# Patient Record
Sex: FEMALE | Race: WHITE | URBAN - METROPOLITAN AREA
[De-identification: names, ages, dates, MRNs, and addresses within clinical notes are randomized per-mention and may not be internally consistent; named-entity substitution may affect disease eponyms.]

---

## 2024-01-10 ENCOUNTER — OFFICE VISIT (OUTPATIENT)
Dept: ENDOCRINOLOGY | Age: 74
End: 2024-01-10
Payer: MEDICARE

## 2024-01-10 VITALS
WEIGHT: 145.2 LBS | DIASTOLIC BLOOD PRESSURE: 88 MMHG | SYSTOLIC BLOOD PRESSURE: 134 MMHG | OXYGEN SATURATION: 94 % | HEIGHT: 61 IN | BODY MASS INDEX: 27.41 KG/M2 | HEART RATE: 76 BPM

## 2024-01-10 DIAGNOSIS — E06.3 HASHIMOTO'S THYROIDITIS: ICD-10-CM

## 2024-01-10 DIAGNOSIS — E03.9 PRIMARY HYPOTHYROIDISM: Primary | ICD-10-CM

## 2024-01-10 DIAGNOSIS — I48.0 PAROXYSMAL ATRIAL FIBRILLATION (HCC): ICD-10-CM

## 2024-01-10 PROCEDURE — G8427 DOCREV CUR MEDS BY ELIG CLIN: HCPCS | Performed by: PHYSICIAN ASSISTANT

## 2024-01-10 PROCEDURE — 99204 OFFICE O/P NEW MOD 45 MIN: CPT | Performed by: PHYSICIAN ASSISTANT

## 2024-01-10 PROCEDURE — 1090F PRES/ABSN URINE INCON ASSESS: CPT | Performed by: PHYSICIAN ASSISTANT

## 2024-01-10 PROCEDURE — 1036F TOBACCO NON-USER: CPT | Performed by: PHYSICIAN ASSISTANT

## 2024-01-10 PROCEDURE — G8419 CALC BMI OUT NRM PARAM NOF/U: HCPCS | Performed by: PHYSICIAN ASSISTANT

## 2024-01-10 PROCEDURE — 1123F ACP DISCUSS/DSCN MKR DOCD: CPT | Performed by: PHYSICIAN ASSISTANT

## 2024-01-10 PROCEDURE — G8400 PT W/DXA NO RESULTS DOC: HCPCS | Performed by: PHYSICIAN ASSISTANT

## 2024-01-10 PROCEDURE — G8484 FLU IMMUNIZE NO ADMIN: HCPCS | Performed by: PHYSICIAN ASSISTANT

## 2024-01-10 PROCEDURE — 3017F COLORECTAL CA SCREEN DOC REV: CPT | Performed by: PHYSICIAN ASSISTANT

## 2024-01-10 RX ORDER — LEVOTHYROXINE SODIUM 0.07 MG/1
75 TABLET ORAL DAILY
COMMUNITY

## 2024-01-10 ASSESSMENT — ENCOUNTER SYMPTOMS
SHORTNESS OF BREATH: 0
DIARRHEA: 1
TROUBLE SWALLOWING: 1
CONSTIPATION: 1
VOICE CHANGE: 1

## 2024-01-10 NOTE — PROGRESS NOTES
[facility] ENDOCRINOLOGY   AND   THYROID NODULE CLINIC    Valorie Lane PA-C  [facility] Endocrinology and Thyroid Nodule Clinic  [address] [place] [address], [facility] [address], [place] [address]  Phone [phone]  Facsimile [phone]          Gris Garcia is a 73 y.o. female seen 1/10/2024 at the request of Dr. Restrepo for the evaluation of primary hypothyroidism        Assessment and Plan:      1. Primary hypothyroidism  Patient with highly variable thyroid labs.  Historically taking her medication inappropriately.  She is currently taking her medication first thing in the morning with only water however she is taking another pill within 10 to 15 minutes and having coffee    We reviewed appropriate dosing, take medication first thing in the morning, with only water, nothing else eat or drink, no other medication waiting 30 minutes to an hour before eating or drinking or taking medication.  Wait 4 hours before ingesting minerals or multivitamins.    Take as Rx'd and update labs in approx 6 weeks, again, at same lab, just prior to follow up    - TSH; Future  - T4, Free; Future  - TSH; Future  - T4, Free; Future    2. Hashimoto's thyroiditis  TPO antibodies positive indicating Hashimoto's thyroiditis. Advised that diagnosis of one autoimmune disease increases risk of developing other autoimmune disease. Importance of self care, stress management, self monitoring was emphasized.      3. Paroxysmal atrial fibrillation (HCC)  Adjust meds to normal TFTs, watch for overtreatment in context of HX of A-fib        Gris was seen today for new patient.    Diagnoses and all orders for this visit:    Primary hypothyroidism  -     TSH; Future  -     T4, Free; Future  -     TSH; Future  -     T4, Free; Future    Hashimoto's thyroiditis    Paroxysmal atrial fibrillation (HCC)            History of Present Illness:    1/10/2024         THYROID DYSFUNCTION  Gris Garcia is seen for new evaluation/management of primary

## 2024-02-28 ENCOUNTER — PATIENT MESSAGE (OUTPATIENT)
Dept: ENDOCRINOLOGY | Age: 74
End: 2024-02-28

## 2024-02-28 NOTE — TELEPHONE ENCOUNTER
Renae response:    I'm sorry you are having these symptoms  Thanks for letting me know you had the labs drawn    I see the 2/21/2024    TSH  2.987    Free T4  1.64    This is great news!     It also means that whatever is causing your symptoms is not your thyroid. Are you able to reach out to primary care and let them know what you are feeling so you can get checked for non-thyroid causes of these complaints?    Please recheck your labs at the same lab just before we meet in May    Lulu

## 2024-02-28 NOTE — TELEPHONE ENCOUNTER
From: Gris Garcia  To: Valroie Lane  Sent: 2/28/2024 10:46 AM EST  Subject: thyroid    Since my thyroid levels have finally leveled to a normal range, the last 2 weeks are bad. I can't sleep, always tired, hungry, mood swings, depressed, angry, hip pain, leg and feet pain at night. etc.

## 2024-05-14 ENCOUNTER — OFFICE VISIT (OUTPATIENT)
Dept: ENDOCRINOLOGY | Age: 74
End: 2024-05-14
Payer: MEDICARE

## 2024-05-14 VITALS
OXYGEN SATURATION: 98 % | HEIGHT: 60 IN | SYSTOLIC BLOOD PRESSURE: 126 MMHG | BODY MASS INDEX: 26.82 KG/M2 | HEART RATE: 84 BPM | DIASTOLIC BLOOD PRESSURE: 84 MMHG | WEIGHT: 136.6 LBS

## 2024-05-14 DIAGNOSIS — I48.0 PAROXYSMAL ATRIAL FIBRILLATION (HCC): ICD-10-CM

## 2024-05-14 DIAGNOSIS — E06.3 HASHIMOTO'S THYROIDITIS: ICD-10-CM

## 2024-05-14 DIAGNOSIS — E03.9 PRIMARY HYPOTHYROIDISM: Primary | ICD-10-CM

## 2024-05-14 PROCEDURE — G8427 DOCREV CUR MEDS BY ELIG CLIN: HCPCS | Performed by: PHYSICIAN ASSISTANT

## 2024-05-14 PROCEDURE — G8400 PT W/DXA NO RESULTS DOC: HCPCS | Performed by: PHYSICIAN ASSISTANT

## 2024-05-14 PROCEDURE — 1036F TOBACCO NON-USER: CPT | Performed by: PHYSICIAN ASSISTANT

## 2024-05-14 PROCEDURE — 99214 OFFICE O/P EST MOD 30 MIN: CPT | Performed by: PHYSICIAN ASSISTANT

## 2024-05-14 PROCEDURE — 1123F ACP DISCUSS/DSCN MKR DOCD: CPT | Performed by: PHYSICIAN ASSISTANT

## 2024-05-14 PROCEDURE — G8419 CALC BMI OUT NRM PARAM NOF/U: HCPCS | Performed by: PHYSICIAN ASSISTANT

## 2024-05-14 PROCEDURE — 1090F PRES/ABSN URINE INCON ASSESS: CPT | Performed by: PHYSICIAN ASSISTANT

## 2024-05-14 PROCEDURE — 3017F COLORECTAL CA SCREEN DOC REV: CPT | Performed by: PHYSICIAN ASSISTANT

## 2024-05-14 PROCEDURE — G2211 COMPLEX E/M VISIT ADD ON: HCPCS | Performed by: PHYSICIAN ASSISTANT

## 2024-05-14 RX ORDER — LEVOTHYROXINE SODIUM 0.05 MG/1
50 TABLET ORAL DAILY
Qty: 90 TABLET | Refills: 1 | Status: SHIPPED | OUTPATIENT
Start: 2024-05-14

## 2024-05-14 ASSESSMENT — ENCOUNTER SYMPTOMS
VOICE CHANGE: 1
DIARRHEA: 1
CONSTIPATION: 1
TROUBLE SWALLOWING: 1
SHORTNESS OF BREATH: 0

## 2024-05-14 NOTE — PROGRESS NOTES
[facility] ENDOCRINOLOGY   AND   THYROID NODULE CLINIC    Valorie Lane PA-C  [facility] Endocrinology and Thyroid Nodule Clinic  [address] [place] [address], [place] [address]  Phone [phone]  Facsimile [phone]          Gris Garcia is a 73 y.o. female seen 5/14/2024 at the request of Dr. Restrepo for the evaluation of primary hypothyroidism        Assessment and Plan:    1. Primary hypothyroidism  Now taking medication correctly and is overtreated with a-fib  Reduce dose from 75mcg to 50mcg  Continue to take correctly  Update labs in 6-8 weeks  Titrate to normal TFTs, watch for overtreatment in context of HX of A-fib    - levothyroxine (SYNTHROID) 50 MCG tablet; Take 1 tablet by mouth daily  Dispense: 90 tablet; Refill: 1  - TSH; Future  - T4, Free; Future  - TSH; Future  - T4, Free; Future    2. Hashimoto's thyroiditis  TPO antibodies positive indicating Hashimoto's thyroiditis. Previously, advised that diagnosis of one autoimmune disease increases risk of developing other autoimmune disease. Importance of self care, stress management, self monitoring was emphasized.      3. Paroxysmal atrial fibrillation (HCC)  Adjust meds to normal TFTs, watch for overtreatment in context of HX of A-fib        Gris was seen today for follow-up.    Diagnoses and all orders for this visit:    Primary hypothyroidism  -     levothyroxine (SYNTHROID) 50 MCG tablet; Take 1 tablet by mouth daily  -     TSH; Future  -     T4, Free; Future  -     TSH; Future  -     T4, Free; Future    Hashimoto's thyroiditis    Paroxysmal atrial fibrillation (HCC)              History of Present Illness:    5/14/2024         THYROID DYSFUNCTION  Gris Garcia is seen for follow up evaluation/management of primary hypothryoidism; this was diagnosed at age 48.      Review of Records:  pt with afib and hypothyroidism referred for uncontrolled labs     Current symptoms:    Age 48  After hysterectomy following years of hormone

## 2024-06-05 ENCOUNTER — PATIENT MESSAGE (OUTPATIENT)
Dept: ENDOCRINOLOGY | Age: 74
End: 2024-06-05

## 2024-06-05 DIAGNOSIS — E03.9 PRIMARY HYPOTHYROIDISM: Primary | ICD-10-CM

## 2024-06-05 RX ORDER — LEVOTHYROXINE SODIUM 0.07 MG/1
TABLET ORAL
Qty: 80 TABLET | Refills: 1 | Status: SHIPPED | OUTPATIENT
Start: 2024-06-05 | End: 2024-06-07

## 2024-06-05 NOTE — TELEPHONE ENCOUNTER
From: Gris Garcia  To: Valorie Lane  Sent: 6/5/2024 7:23 AM EDT  Subject: thyroid    with the change of levothyroxine 75 to 50 MCG, after a week I itch all over my body. and I just recently started to get rash.  Hungry all the time.    Gris Garcia 1950

## 2024-06-05 NOTE — TELEPHONE ENCOUNTER
Spoke to pt    Has pruritic rash of abdomen (bilateral) and neck  Feeling hungry    No SOB, oral swelling  Took benadryl for 3 days with no improvement     Willing to resume the 75mcg dose and take 5 day per week, TDD 54    If rash does not improve, seek evaluation from PCP. If worsens or involves airway, proceed to ER    If improves with med change we can consider challenging 50mcg tablet at a later date as it is least likely to cause allergic response

## 2024-06-07 ENCOUNTER — PATIENT MESSAGE (OUTPATIENT)
Dept: ENDOCRINOLOGY | Age: 74
End: 2024-06-07

## 2024-06-07 DIAGNOSIS — E03.9 PRIMARY HYPOTHYROIDISM: ICD-10-CM

## 2024-06-07 RX ORDER — LEVOTHYROXINE SODIUM 0.05 MG/1
50 TABLET ORAL DAILY
Qty: 90 TABLET | Refills: 1
Start: 2024-06-07

## 2024-06-07 NOTE — TELEPHONE ENCOUNTER
From: Gris Garcia  To: Valorie Lane  Sent: 6/7/2024 12:02 PM EDT  Subject: visited family     contact dermititis, poison ivy    Prednisone and cream    I will go back to the 48 mgn    thank you     Gris Garcia 1950

## 2024-06-07 NOTE — TELEPHONE ENCOUNTER
Renae response:    I'm glad you got an answer. I will adjust the chart to reflect you are taking the 50mcg dose     Feel better!    Lulu

## 2024-07-10 ENCOUNTER — PATIENT MESSAGE (OUTPATIENT)
Dept: ENDOCRINOLOGY | Age: 74
End: 2024-07-10

## 2024-07-10 DIAGNOSIS — E03.9 PRIMARY HYPOTHYROIDISM: ICD-10-CM

## 2024-07-10 RX ORDER — LEVOTHYROXINE SODIUM 0.07 MG/1
TABLET ORAL
Qty: 90 TABLET | Refills: 1 | Status: SHIPPED | OUTPATIENT
Start: 2024-07-10

## 2024-07-10 NOTE — TELEPHONE ENCOUNTER
Renae response:    Hi,  Thanks for letting me know you had your labs drawn. I was able to find them and they are abnormal on the 50mcg dose    I would like you to restart the 75mcg dose HOWEVER, could you please pick one day a week (like on Sunday) where you ONLY TAKE a HALF PILL?    Please plan to get your labs rechecked right before we meet in September. RX was sent to [facility] in [place].    Let me know that you got this message and if you have any questions.    ~Valorie Yang TDD=69     07/08/2024    TSH  52.638    Free T4 1.1

## 2024-07-10 NOTE — TELEPHONE ENCOUNTER
From: Gris Garcia  To: Valorie Lane  Sent: 7/10/2024 9:37 AM EDT  Subject: thyroid test done on 7/9/24    did you receive results and now what,   tired all the time, mood swings, chills I need help

## 2024-09-16 ENCOUNTER — OFFICE VISIT (OUTPATIENT)
Dept: ENDOCRINOLOGY | Age: 74
End: 2024-09-16
Payer: MEDICARE

## 2024-09-16 VITALS
HEART RATE: 74 BPM | SYSTOLIC BLOOD PRESSURE: 122 MMHG | BODY MASS INDEX: 26.81 KG/M2 | OXYGEN SATURATION: 96 % | WEIGHT: 138.4 LBS | DIASTOLIC BLOOD PRESSURE: 82 MMHG

## 2024-09-16 DIAGNOSIS — E03.9 PRIMARY HYPOTHYROIDISM: Primary | ICD-10-CM

## 2024-09-16 DIAGNOSIS — I48.0 PAROXYSMAL ATRIAL FIBRILLATION (HCC): ICD-10-CM

## 2024-09-16 DIAGNOSIS — E06.3 HASHIMOTO'S THYROIDITIS: ICD-10-CM

## 2024-09-16 PROCEDURE — G8419 CALC BMI OUT NRM PARAM NOF/U: HCPCS | Performed by: PHYSICIAN ASSISTANT

## 2024-09-16 PROCEDURE — 1090F PRES/ABSN URINE INCON ASSESS: CPT | Performed by: PHYSICIAN ASSISTANT

## 2024-09-16 PROCEDURE — 99214 OFFICE O/P EST MOD 30 MIN: CPT | Performed by: PHYSICIAN ASSISTANT

## 2024-09-16 PROCEDURE — G8427 DOCREV CUR MEDS BY ELIG CLIN: HCPCS | Performed by: PHYSICIAN ASSISTANT

## 2024-09-16 PROCEDURE — G2211 COMPLEX E/M VISIT ADD ON: HCPCS | Performed by: PHYSICIAN ASSISTANT

## 2024-09-16 PROCEDURE — 3017F COLORECTAL CA SCREEN DOC REV: CPT | Performed by: PHYSICIAN ASSISTANT

## 2024-09-16 PROCEDURE — G8400 PT W/DXA NO RESULTS DOC: HCPCS | Performed by: PHYSICIAN ASSISTANT

## 2024-09-16 PROCEDURE — 1123F ACP DISCUSS/DSCN MKR DOCD: CPT | Performed by: PHYSICIAN ASSISTANT

## 2024-09-16 PROCEDURE — 1036F TOBACCO NON-USER: CPT | Performed by: PHYSICIAN ASSISTANT

## 2024-09-16 RX ORDER — LEVOTHYROXINE SODIUM 75 UG/1
TABLET ORAL
Qty: 90 TABLET | Refills: 1 | Status: SHIPPED | OUTPATIENT
Start: 2024-09-16

## 2024-09-16 ASSESSMENT — ENCOUNTER SYMPTOMS
DIARRHEA: 1
CONSTIPATION: 1
SHORTNESS OF BREATH: 0
TROUBLE SWALLOWING: 1
VOICE CHANGE: 1

## 2024-11-12 ENCOUNTER — TELEPHONE (OUTPATIENT)
Dept: ENDOCRINOLOGY | Age: 74
End: 2024-11-12

## 2024-11-12 DIAGNOSIS — E03.9 PRIMARY HYPOTHYROIDISM: ICD-10-CM

## 2024-11-12 RX ORDER — LEVOTHYROXINE SODIUM 75 UG/1
TABLET ORAL
Qty: 90 TABLET | Refills: 1 | Status: SHIPPED | OUTPATIENT
Start: 2024-11-12

## 2024-11-12 NOTE — TELEPHONE ENCOUNTER
Thyroid labs reviewed from 11/8/24 11/8/2024    TSH  5.495    Free T4 1.57    On 75mcg 6 days per week, TDD 64.3    I would like pt to continue 75mcg 6 days per week and on day 7 to TAKE A HALF TABLET of the 75mcg levothyroxine, TDD 69    Recheck labs just before our January follow up

## 2025-01-21 ENCOUNTER — OFFICE VISIT (OUTPATIENT)
Dept: ENDOCRINOLOGY | Age: 75
End: 2025-01-21
Payer: MEDICARE

## 2025-01-21 VITALS
OXYGEN SATURATION: 97 % | HEART RATE: 69 BPM | HEIGHT: 62 IN | SYSTOLIC BLOOD PRESSURE: 116 MMHG | DIASTOLIC BLOOD PRESSURE: 70 MMHG | WEIGHT: 136.4 LBS | BODY MASS INDEX: 25.1 KG/M2

## 2025-01-21 DIAGNOSIS — E03.9 PRIMARY HYPOTHYROIDISM: Primary | ICD-10-CM

## 2025-01-21 DIAGNOSIS — I48.0 PAROXYSMAL ATRIAL FIBRILLATION (HCC): ICD-10-CM

## 2025-01-21 DIAGNOSIS — E06.3 HASHIMOTO'S THYROIDITIS: ICD-10-CM

## 2025-01-21 PROCEDURE — 1123F ACP DISCUSS/DSCN MKR DOCD: CPT | Performed by: PHYSICIAN ASSISTANT

## 2025-01-21 PROCEDURE — G2211 COMPLEX E/M VISIT ADD ON: HCPCS | Performed by: PHYSICIAN ASSISTANT

## 2025-01-21 PROCEDURE — 1036F TOBACCO NON-USER: CPT | Performed by: PHYSICIAN ASSISTANT

## 2025-01-21 PROCEDURE — G8427 DOCREV CUR MEDS BY ELIG CLIN: HCPCS | Performed by: PHYSICIAN ASSISTANT

## 2025-01-21 PROCEDURE — G8419 CALC BMI OUT NRM PARAM NOF/U: HCPCS | Performed by: PHYSICIAN ASSISTANT

## 2025-01-21 PROCEDURE — 1160F RVW MEDS BY RX/DR IN RCRD: CPT | Performed by: PHYSICIAN ASSISTANT

## 2025-01-21 PROCEDURE — G8400 PT W/DXA NO RESULTS DOC: HCPCS | Performed by: PHYSICIAN ASSISTANT

## 2025-01-21 PROCEDURE — 1159F MED LIST DOCD IN RCRD: CPT | Performed by: PHYSICIAN ASSISTANT

## 2025-01-21 PROCEDURE — 1090F PRES/ABSN URINE INCON ASSESS: CPT | Performed by: PHYSICIAN ASSISTANT

## 2025-01-21 PROCEDURE — 99214 OFFICE O/P EST MOD 30 MIN: CPT | Performed by: PHYSICIAN ASSISTANT

## 2025-01-21 RX ORDER — LEVOTHYROXINE SODIUM 75 UG/1
TABLET ORAL
Qty: 90 TABLET | Refills: 1 | Status: SHIPPED | OUTPATIENT
Start: 2025-01-21

## 2025-01-21 ASSESSMENT — ENCOUNTER SYMPTOMS
DIARRHEA: 1
SHORTNESS OF BREATH: 0
VOICE CHANGE: 1
CONSTIPATION: 1
TROUBLE SWALLOWING: 1

## 2025-01-21 NOTE — PROGRESS NOTES
[facility] ENDOCRINOLOGY   AND   THYROID NODULE CLINIC    Valorie Lane PA-C  [facility] Endocrinology and Thyroid Nodule Clinic  [address] [place] [address], [place] [address]  Phone [phone]  Facsimile [phone]          Gris Garcia is a 74 y.o. female seen 1/21/2025 for follow up evaluation of primary hypothyroidism        Assessment and Plan:        1. Primary hypothyroidism  TSH in range, slighlty elevated free T4 with NO A-FIB  Continue current dose and notify office if any signs of overtreatment which were reviewed today or if onset of A-fib for early labs   - TSH; Future  - T4, Free; Future  - levothyroxine (SYNTHROID) 75 MCG tablet; Take 1 pill on an empty stomach 6 days per week, half tab on Sunday, TDD 69mcg  Dispense: 90 tablet; Refill: 1      2. Hashimoto's thyroiditis  TPO antibodies positive indicating Hashimoto's thyroiditis. Previously, advised that diagnosis of one autoimmune disease increases risk of developing other autoimmune disease. Importance of self care, stress management, self monitoring was emphasized.      3. Paroxysmal atrial fibrillation (HCC)  Adjust meds to normal TFTs, watch for overtreatment in context of HX of A-fib        Gris was seen today for follow-up.    Diagnoses and all orders for this visit:    Primary hypothyroidism  -     TSH; Future  -     T4, Free; Future  -     levothyroxine (SYNTHROID) 75 MCG tablet; Take 1 pill on an empty stomach 6 days per week, half tab on Sunday, TDD 69mcg    Hashimoto's thyroiditis    Paroxysmal atrial fibrillation (HCC)                  History of Present Illness:    01/21/2025    In interim since last visit dose was increased slightly from TDD 64mcg up to TDD 69mcg daily  Did not note any significant change in condition  Continue to walk 2 miles 5x per week  Keto diet with intentional weight loss    Wt Readings from Last 3 Encounters:   01/21/25 61.9 kg (136 lb 6.4 oz)   09/16/24 62.8 kg (138 lb 6.4 oz)